# Patient Record
Sex: FEMALE | Race: WHITE | NOT HISPANIC OR LATINO | Employment: FULL TIME | URBAN - METROPOLITAN AREA
[De-identification: names, ages, dates, MRNs, and addresses within clinical notes are randomized per-mention and may not be internally consistent; named-entity substitution may affect disease eponyms.]

---

## 2022-10-27 PROBLEM — E78.2 HYPERLIPIDEMIA, MIXED: Status: ACTIVE | Noted: 2022-10-27

## 2022-10-27 PROBLEM — I12.9 HYPERTENSION WITH IMPAIRED RENAL FUNCTION: Status: ACTIVE | Noted: 2022-10-27

## 2022-10-27 PROBLEM — N18.2 CHRONIC KIDNEY DISEASE, STAGE II (MILD): Status: ACTIVE | Noted: 2022-10-27

## 2022-10-27 PROBLEM — R80.9 NON-NEPHROTIC RANGE PROTEINURIA: Status: ACTIVE | Noted: 2022-10-27

## 2022-10-27 PROBLEM — E11.21 DIABETIC NEPHROPATHY ASSOCIATED WITH TYPE 2 DIABETES MELLITUS: Status: ACTIVE | Noted: 2022-10-27

## 2022-10-27 NOTE — ASSESSMENT & PLAN NOTE
Poorly Controlled with HgbA1c 9.2% on 10/26/22 - Continue Metformin 1000 mg po BID; Start Ozempic

## 2022-10-27 NOTE — PROGRESS NOTES
Pt Name:  Kelly Majano  Pt :  1965  Pt MRN:  [id number]    Date: 10/28/2022    Reason for visit:   Kelly Majano is a 57 y.o. c female presenting for CKD follow up with serum creatinine 0.91, eGFR 71 and Chronic Kidney Disease Stage 2.     Chief Complaint:   The patient denies any complaints today.    HPI:  The patient is being seen today for follow up CKD and the status of her kidney function. Since the last visit, patient reports no major health changes. However, she does report that she is having lower extremity pain.  She was seen and evaluated by a chiropractor and was told her SI joints are out of alignment.   The patient denies fatigue, weakness, poor appetite, metallic taste, nausea, cramping, chest pain, palpitations, SOB, orthopnea, PND, edema, trouble concentrating, decreased urination.      Home BPs when checked are <130/80 per patient with occasional elevated Bps around 140-150/90. The patient is following a low sodium diet. The patient is avoiding NSAIDs. The patient is drinking 64 oz of water daily.     Since last visit on 22 patient reports no changes in medical history.      History:   Past Medical History:   Diagnosis Date    Atrial fibrillation with RVR     BMI 40.0-44.9, adult     Coronary artery disease     AFIB    Disorder of kidney and ureter     Essential (primary) hypertension     History of pyelonephritis     Long term current use of anticoagulant     Xarelto    Mild intermittent asthma, uncomplicated     Neuromuscular disorder     neuropathy in feet    Rheumatoid arthritis, unspecified     Type 2 diabetes mellitus without complications     Ureteral stone      Past Surgical History:   Procedure Laterality Date     SECTION      CYSTOSCOPY W/ URETERAL STENT PLACEMENT Right 10/16/2019    Cystoscopy with retrograde pyelogram and right stent placement    CYSTOSCOPY W/ URETEROSCOPY W/ LITHOTRIPSY Right 2019    RPG, STENT REMOVAL    cystoscopy, rpg, R ureter with balloon  dilatation of stricture, and stent placement  09/24/2020    NEPHRECTOMY Right 12/15/2020    Hand-assisted laparoscopic right nephrectomy     Family History   Problem Relation Age of Onset    Hypertension Mother     Anesthesia problems Mother     Hypertension Father     Cancer Father     Hearing loss Father      Social History     Substance and Sexual Activity   Alcohol Use Not Currently     Social History     Substance and Sexual Activity   Drug Use Not Currently     Social History     Substance and Sexual Activity   Sexual Activity Yes     reports being sexually active.  Social History     Tobacco Use   Smoking Status Never   Smokeless Tobacco Never       Allergies:  Review of patient's allergies indicates:   Allergen Reactions    Lisinopril Other (See Comments)     Caused heart arrythmias.    Meperidine      Makes her vomit         Current Outpatient Medications:     atorvastatin (LIPITOR) 20 MG tablet, Take 20 mg by mouth once daily., Disp: , Rfl:     gabapentin (NEURONTIN) 300 MG capsule, Take 300 mg by mouth 2 (two) times daily., Disp: , Rfl:     irbesartan (AVAPRO) 150 MG tablet, Take 150 mg by mouth 2 (two) times a day., Disp: , Rfl:     metFORMIN (GLUCOPHAGE) 1000 MG tablet, Take 1,000 mg by mouth 2 (two) times a day., Disp: , Rfl:     metoprolol tartrate (LOPRESSOR) 100 MG tablet, 2 (two) times a day., Disp: , Rfl:     rivaroxaban (XARELTO) 20 mg Tab, once daily., Disp: , Rfl:     sotaloL (BETAPACE) 120 MG Tab, Take 120 mg by mouth 2 (two) times a day., Disp: , Rfl:     semaglutide (OZEMPIC) 0.25 mg or 0.5 mg(2 mg/1.5 mL) pen injector, Inject 0.25 mg into the skin every 7 days., Disp: 1 pen, Rfl: 0    ROS:  Review of Systems   Constitutional:  Negative for activity change and appetite change.   HENT:  Negative for hearing loss.    Eyes:  Negative for visual disturbance.   Respiratory:  Negative for shortness of breath and wheezing.    Cardiovascular:  Negative for chest pain.   Gastrointestinal:  Negative  "for abdominal pain, diarrhea, nausea and vomiting.   Genitourinary:  Negative for decreased urine volume, dysuria, flank pain, frequency and urgency.   Musculoskeletal:         Lower extremity pain    Neurological:  Negative for dizziness, weakness and headaches.     Physical Exam:   Vitals:   Vitals:    10/28/22 0829   BP: (!) 144/78   Pulse: 91   SpO2: 97%   Weight: 127.6 kg (281 lb 6.4 oz)   Height: 5' 7" (1.702 m)   PainSc: 0-No pain     Body mass index is 44.07 kg/m².    Physical Exam  Vitals reviewed.   Constitutional:       General: She is not in acute distress.     Appearance: Normal appearance. She is morbidly obese.   HENT:      Head: Normocephalic and atraumatic.      Mouth/Throat:      Mouth: Mucous membranes are moist.   Eyes:      Extraocular Movements: Extraocular movements intact.      Pupils: Pupils are equal, round, and reactive to light.   Cardiovascular:      Rate and Rhythm: Normal rate. Rhythm regularly irregular.      Heart sounds: No murmur heard.  Pulmonary:      Effort: Pulmonary effort is normal.      Breath sounds: No wheezing, rhonchi or rales.   Musculoskeletal:         General: No swelling.      Right lower le+ Pitting Edema present.      Left lower le+ Pitting Edema present.      Comments: Straight leg raise test is negative    Skin:     General: Skin is warm and dry.   Neurological:      Mental Status: She is alert and oriented to person, place, and time.   Psychiatric:         Mood and Affect: Mood normal.         Behavior: Behavior normal.       Labs/Tests:  Lab Results   Component Value Date     10/26/2022    K 4.7 10/26/2022     10/26/2022    CO2 28 10/26/2022    BUN 14 10/26/2022    CREATININE 0.91 10/26/2022    CREATININE 0.93 2022    CREATININE 1.18 (H) 2021    GLUCOSE 140 (H) 10/26/2022    CALCIUM 9.2 10/26/2022    PHOSPHORUS 4.1 10/26/2022    ALBUMIN 4.1 10/26/2022    EGFRNONAA 71 (L) 10/26/2022    EGFRNONAA 70 (L) 2022    EGFRNONAA 52 " (L) 12/17/2021    ESTGFRAFRICA 82 (L) 10/26/2022    ESTGFRAFRICA 80 (L) 02/22/2022    ESTGFRAFRICA 60 (L) 12/17/2021    PTH 40 10/26/2022    HGB 12.9 10/26/2022    HGB 11.6 02/22/2022    HGB 12.0 11/17/2021    HGBA1C 9.2 (H) 10/26/2022    IRON 63 01/27/2022    TIBC 327 01/27/2022    FERRITIN 367.0 (H) 01/27/2022    TRIG 209 (H) 10/26/2022    CHOL 108 10/26/2022    HDL 31 (L) 10/26/2022    LDLCALC 36 10/26/2022    LABVLDL 42 (H) 10/26/2022    [id number] 32.8 10/26/2022     Component Ref Range & Units 1 d ago   (10/26/22) 8 mo ago   (2/22/22) 11 mo ago   (11/17/21)   Protein, Urine mg/dL 83  125  265    Creatinine, Urine mg/dL 68.8  86.2  233.1    Urine Protein/Creatinine Ratio <0.2 mg of protein/mg of creatinine 1.2 High   1.4 High   1.1 High           Diagnosis:  1. Diabetic nephropathy associated with type 2 diabetes mellitus  Renal Function Panel    Hemoglobin A1C    Renal Function Panel    Hemoglobin A1C      2. Hypertension with impaired renal function  Renal Function Panel    Renal Function Panel      3. Chronic kidney disease, stage II (mild)  Hemoglobin    Vitamin D    Renal Function Panel    PTH, Intact    Hemoglobin A1C    Hemoglobin    Vitamin D    Renal Function Panel    PTH, Intact    Hemoglobin A1C      4. Non-nephrotic range proteinuria  Protein/Creatinine Ratio, Urine    Protein/Creatinine Ratio, Urine      5. Hyperlipidemia, mixed        6. H/O right nephrectomy  Renal Function Panel    Renal Function Panel      7. Morbidly obese             Plan:  Diabetic nephropathy associated with type 2 diabetes mellitus  Poorly Controlled with HgbA1c 9.2% on 10/26/22 - Continue Metformin 1000 mg po BID; Start Ozempic     Hypertension with impaired renal function  Near goal, Monitor BP, 2 Gram NA diet, Continue Avapro 150 mg po BID; Lopressor 100 mg po BID     Chronic kidney disease, stage II (mild)  Serum Creatinine 0.91 / eGFR 71 - without Anemia - without SHPT -  Avoid NSAIDS, Assure 64 oz of water daily,  Meds per med list above     Non-nephrotic range proteinuria  1200 mg - down from 1400 mg - ARB     She is allergic to ACE; cannot afford SGLT2     Hyperlipidemia, mixed  Continue Lipitor 20 mg po daily     Morbidly obese  BMI 44 - have encouraged exercise of 150 minutes per week.  She reports between work (works on average 70 hours per week between 2 jobs).  Start Ozempic for both increased diabetic control and for weight loss.     Have discussed Protein / Carbohydrate pairing and provided a handout on healthy eating.        My reconciliation of medication should not condone or support use of medications that have been previously prescribed for patients by other providers.  I am only documenting the current medications patients is taking and may change doses, add or discontinue medications based on information provided by the patient.     The patient has been provided with their current level of kidney function including eGFR and creatinine.    We discussed the potential for common complications of CKD including anemia, electrolyte abnormalities, abnormal fluid balance, mineral bone disease and malnutrition.    We discussed strategies to slow the progression of their kidney disease including:  Avoid nephrotoxic agents. Avoid over-the-counter and prescription NSAIDs (Ibuprofren, Motrin, Naproxyn, Aleve, Mobic, Celebrex, Toradol, Advil). All of these can worsen kidney function, elevate BP, cause fluid retention/swelling and elevate potassium. Avoid iodine contrast agents and gadolinium, which can worsen kidney function and/or cause kidney failure. Avoid phosphosoda bowel preps which can worsen kidney function.  Work to improve modifiable risk factors. Aim for good control of blood glucose without episodes of hypoglycemia. Notify the provider managing your diabetes if your blood glucose < 60. Aim for good blood pressure control without episodes of hypotension. Call the office if your systolic blood pressure is  consistently < 110. Aim for good control of your cholesterol.  AIC goal <7.0  BP goal <130/80        Keeping these in goal range will help prevent progression of cardiovascular disease            and chronic kidney disease.    We discussed dietary modifications:  Low sodium diet: 2 gm/d or less  Limit/avoid high potassium foods  Avoid potassium containing salt substitutes  Limit/avoid high phosphorus foods  Limit daily protein intake to 0.8-1 gm/kg of your ideal body weight.    We discussed lifestyle modifications:  Make sure you are drinking plenty of fluids--64 ounces (1/2 gallon) daily  Exercise at least 30 minutes 5 x per week (total 150 minutes per week), example brisk walking  Achieve and maintain a healthy weight (BMI 20-25)  Limit alcohol consumption to <2 drinks per day  Stop smoking  Make sure you stay current on important vaccines-- pneumonia vaccines (Pneumovax and Prevnar), flu vaccine, Hepatitis B (especially patients nearing renal replacement therapy and planning hemodialysis) and Covid-19 vaccine.     Recommendations:  Monitor your BP at home daily and record.  Bring readings to your next appt.  Call the office if your BP is persistently >130/80.  Seek urgent medical attention with signs and symptoms of uremia - extreme weakness, fatigue, confusion, anorexia, metallic taste in mouth, hiccoughs, cramping, itching, chest pain, swelling, or trouble sleeping.    Follow Up:   Follow up in about 1 year (around 10/28/2023).

## 2022-10-28 ENCOUNTER — OFFICE VISIT (OUTPATIENT)
Dept: NEPHROLOGY | Facility: CLINIC | Age: 57
End: 2022-10-28
Payer: COMMERCIAL

## 2022-10-28 VITALS
HEIGHT: 67 IN | OXYGEN SATURATION: 97 % | DIASTOLIC BLOOD PRESSURE: 78 MMHG | SYSTOLIC BLOOD PRESSURE: 144 MMHG | WEIGHT: 281.38 LBS | HEART RATE: 91 BPM | BODY MASS INDEX: 44.16 KG/M2

## 2022-10-28 DIAGNOSIS — E11.21 DIABETIC NEPHROPATHY ASSOCIATED WITH TYPE 2 DIABETES MELLITUS: Primary | ICD-10-CM

## 2022-10-28 DIAGNOSIS — N18.2 CHRONIC KIDNEY DISEASE, STAGE II (MILD): ICD-10-CM

## 2022-10-28 DIAGNOSIS — Z90.5 H/O RIGHT NEPHRECTOMY: ICD-10-CM

## 2022-10-28 DIAGNOSIS — E78.2 HYPERLIPIDEMIA, MIXED: ICD-10-CM

## 2022-10-28 DIAGNOSIS — E66.01 MORBIDLY OBESE: ICD-10-CM

## 2022-10-28 DIAGNOSIS — I12.9 HYPERTENSION WITH IMPAIRED RENAL FUNCTION: ICD-10-CM

## 2022-10-28 DIAGNOSIS — R80.9 NON-NEPHROTIC RANGE PROTEINURIA: ICD-10-CM

## 2022-10-28 PROCEDURE — 99214 PR OFFICE/OUTPT VISIT, EST, LEVL IV, 30-39 MIN: ICD-10-PCS | Mod: ,,, | Performed by: NURSE PRACTITIONER

## 2022-10-28 PROCEDURE — 3077F SYST BP >= 140 MM HG: CPT | Mod: ,,, | Performed by: NURSE PRACTITIONER

## 2022-10-28 PROCEDURE — 3078F PR MOST RECENT DIASTOLIC BLOOD PRESSURE < 80 MM HG: ICD-10-PCS | Mod: ,,, | Performed by: NURSE PRACTITIONER

## 2022-10-28 PROCEDURE — 99214 OFFICE O/P EST MOD 30 MIN: CPT | Mod: ,,, | Performed by: NURSE PRACTITIONER

## 2022-10-28 PROCEDURE — 3066F PR DOCUMENTATION OF TREATMENT FOR NEPHROPATHY: ICD-10-PCS | Mod: ,,, | Performed by: NURSE PRACTITIONER

## 2022-10-28 PROCEDURE — 3077F PR MOST RECENT SYSTOLIC BLOOD PRESSURE >= 140 MM HG: ICD-10-PCS | Mod: ,,, | Performed by: NURSE PRACTITIONER

## 2022-10-28 PROCEDURE — 4010F ACE/ARB THERAPY RXD/TAKEN: CPT | Mod: ,,, | Performed by: NURSE PRACTITIONER

## 2022-10-28 PROCEDURE — 1159F PR MEDICATION LIST DOCUMENTED IN MEDICAL RECORD: ICD-10-PCS | Mod: ,,, | Performed by: NURSE PRACTITIONER

## 2022-10-28 PROCEDURE — 1160F RVW MEDS BY RX/DR IN RCRD: CPT | Mod: ,,, | Performed by: NURSE PRACTITIONER

## 2022-10-28 PROCEDURE — 1160F PR REVIEW ALL MEDS BY PRESCRIBER/CLIN PHARMACIST DOCUMENTED: ICD-10-PCS | Mod: ,,, | Performed by: NURSE PRACTITIONER

## 2022-10-28 PROCEDURE — 3066F NEPHROPATHY DOC TX: CPT | Mod: ,,, | Performed by: NURSE PRACTITIONER

## 2022-10-28 PROCEDURE — 1159F MED LIST DOCD IN RCRD: CPT | Mod: ,,, | Performed by: NURSE PRACTITIONER

## 2022-10-28 PROCEDURE — 4010F PR ACE/ARB THEARPY RXD/TAKEN: ICD-10-PCS | Mod: ,,, | Performed by: NURSE PRACTITIONER

## 2022-10-28 PROCEDURE — 3078F DIAST BP <80 MM HG: CPT | Mod: ,,, | Performed by: NURSE PRACTITIONER

## 2022-10-28 RX ORDER — SOTALOL HYDROCHLORIDE 120 MG/1
120 TABLET ORAL 2 TIMES DAILY
COMMUNITY
Start: 2022-09-06 | End: 2023-09-01

## 2022-10-28 RX ORDER — ATORVASTATIN CALCIUM 20 MG/1
20 TABLET, FILM COATED ORAL DAILY
COMMUNITY
Start: 2022-09-30

## 2022-10-28 NOTE — ASSESSMENT & PLAN NOTE
BMI 44 - have encouraged exercise of 150 minutes per week.  She reports between work (works on average 70 hours per week between 2 jobs).  Start Ozempic for both increased diabetic control and for weight loss.     Have discussed Protein / Carbohydrate pairing and provided a handout on healthy eating.

## 2023-10-30 DIAGNOSIS — Z90.5 H/O RIGHT NEPHRECTOMY: ICD-10-CM

## 2023-10-30 DIAGNOSIS — E11.21 DIABETIC NEPHROPATHY ASSOCIATED WITH TYPE 2 DIABETES MELLITUS: ICD-10-CM

## 2023-10-30 DIAGNOSIS — N18.2 CHRONIC KIDNEY DISEASE, STAGE II (MILD): Primary | ICD-10-CM

## 2024-07-08 NOTE — PATIENT INSTRUCTIONS
MEDICATIONS TO AVOID     ALL NON-STEROIDAL ANTI-INFLAMMATORY DRUGS (NSAID'S)    CELEBREX      ANAPROX  LODINE      TORADOL  NUPRIN      IBUPROFEN  NAPROSYN      MOBIC  ADVIL       ALEVE  VOLTAREN      MOTRIN  INDOMETHACIN (INDOCIN)    ECOTRIN  DAYPRO      FELDENE    TYLENOL, ACETAMINOPHEN, OR PRESCRIPTION PAIN MEDICATION (WITH THE EXCEPTION OF ABOVE) IS SAFE FOR KIDNEY PATIENTS    Please DO NOT take any of these medications without first discussing with your Doctor or Nurse Practitioner      The patient has been provided with their current level of kidney function including eGFR and creatinine.    We discussed the potential for common complications of CKD including anemia, electrolyte abnormalities, abnormal fluid balance, mineral bone disease and malnutrition.    We discussed strategies to slow the progression of their kidney disease including:  Avoid nephrotoxic agents. Avoid over-the-counter and prescription NSAIDs (Ibuprofren, Motrin, Naproxyn, Aleve, Mobic, Celebrex, Toradol, Advil). All of these can worsen kidney function, elevate BP, cause fluid retention/swelling and elevate potassium. Avoid iodine contrast agents and gadolinium, which can worsen kidney function and/or cause kidney failure. Avoid phosphosoda bowel preps which can worsen kidney function.  Work to improve modifiable risk factors. Aim for good control of blood glucose without episodes of hypoglycemia. Notify the provider managing your diabetes if your blood glucose < 60. Aim for good blood pressure control without episodes of hypotension. Call the office if your systolic blood pressure is consistently < 110. Aim for good control of your cholesterol.  AIC goal <7.0  BP goal <130/80        Keeping these in goal range will help prevent progression of cardiovascular disease            and chronic kidney disease.    We discussed dietary modifications:  Low sodium diet: 2 gm/d or less  Limit/avoid high potassium foods  Avoid potassium  Peripheral Block    Pre-sedation assessment completed: 7/8/2024 7:40 AM    Patient reassessed immediately prior to procedure    Patient location during procedure: OR  Start time: 7/8/2024 7:41 AM  Stop time: 7/8/2024 7:44 AM  Reason for block: at surgeon's request and post-op pain management  Performed by  Anesthesiologist: Jesus Curran MD  Preanesthetic Checklist  Completed: patient identified, IV checked, site marked, risks and benefits discussed, surgical consent, monitors and equipment checked, pre-op evaluation and timeout performed  Prep:  Pt Position: supine  Sterile barriers:cap, gloves, mask and washed/disinfected hands  Prep: ChloraPrep  Patient monitoring: blood pressure monitoring, continuous pulse oximetry and EKG  Procedure    Sedation: yes  Performed under: general  Guidance:ultrasound guided  Images:still images obtained, printed/placed on chart    Laterality:Bilateral  Block Type:TAP  Injection Technique:single-shot  Needle Type:short-bevel and echogenic  Needle Gauge:20 G  Resistance on Injection: none    Medications Used: bupivacaine PF (MARCAINE) 0.25 % injection - Injection   60 mL - 7/8/2024 7:44:00 AM  dexamethasone sodium phosphate injection - Injection   4 mg - 7/8/2024 7:44:00 AM      Medications  Comment:Block Injection:  LA dose divided between Right and Left block        Post Assessment  Injection Assessment: negative aspiration for heme, incremental injection and no paresthesia on injection  Patient Tolerance:comfortable throughout block  Complications:no  Additional Notes      Mid-Axillary/Lateral TAPs    A high-frequency linear transducer, with sterile cover, was placed in the midaxillary line between the ASIS and costal margin. The External Oblique Muscle (EOM), Internal Oblique Muscle (IOM), Transverse Abdominus Muscle (BROWNE), and Peritoneum were identified. The insertion site was prepped in sterile fashion and then localized with 2-5 ml of 1% Lidocaine. Using  "ultrasound-guidance, a 20-gauge B-Arroyo 4\" Ultraplex 360 non-stimulating echogenic needle was advanced in plane, from medial to lateral, until the tip of the needle was in the fascial plane between the IOM and BROWNE. 1-3ml of preservative free normal saline was used to hydro-dissect the fascial planes. After the fascial plane was verified, the local anesthetic (LA) was injected. The procedure was repeated on the opposite side for bilateral coverage. Aspiration every 5 ml to prevent intravascular injection. Injection was completed with negative aspiration of blood and negative intravascular injection. Injection pressures were normal with minimal resistance. Midaxillary TAPs should reach intercostal nerves T10- T11 and the subcostal nerve T12.    Performed by: Jesus Curran MD            " containing salt substitutes  Limit/avoid high phosphorus foods  Limit daily protein intake to 0.8-1 gm/kg of your ideal body weight.    We discussed lifestyle modifications:  Make sure you are drinking plenty of fluids--64 ounces (1/2 gallon) daily  Exercise at least 30 minutes 5 x per week (total 150 minutes per week), example brisk walking  Achieve and maintain a healthy weight (BMI 20-25)  Limit alcohol consumption to <2 drinks per day  Stop smoking  Make sure you stay current on important vaccines-- pneumonia vaccines (Pneumovax and Prevnar), flu vaccine, Hepatitis B (especially patients nearing renal replacement therapy and planning hemodialysis) and Covid-19 vaccine.     Recommendations:  Monitor your BP at home daily and record.  Bring readings to your next appt.  Call the office if your BP is persistently >130/80.  Seek urgent medical attention with signs and symptoms of uremia - extreme weakness, fatigue, confusion, anorexia, metallic taste in mouth, hiccoughs, cramping, itching, chest pain, swelling, or trouble sleeping.